# Patient Record
Sex: FEMALE | Race: WHITE | NOT HISPANIC OR LATINO | URBAN - METROPOLITAN AREA
[De-identification: names, ages, dates, MRNs, and addresses within clinical notes are randomized per-mention and may not be internally consistent; named-entity substitution may affect disease eponyms.]

---

## 2018-07-10 ENCOUNTER — PREPPED CHART (OUTPATIENT)
Dept: URBAN - METROPOLITAN AREA CLINIC 16 | Facility: CLINIC | Age: 38
End: 2018-07-10

## 2019-09-24 NOTE — PATIENT DISCUSSION
CATARACT, OU - VISUALLY SIGNIFICANT OS &gt; OD. SCHEDULE SX OS THEN LATER IN OD IF VISUAL SYMPTOMS PERSIST.

## 2019-10-02 NOTE — PATIENT DISCUSSION
Continue: prednisol ace-gatiflox-bromfen (prednisol ace-gatiflox-bromfen): drops,suspension: 1-0.5-0.075% 1 drop three times a day into affected eye 09-

## 2019-10-02 NOTE — PATIENT DISCUSSION
Myopia Counseling: The diagnosis of myopia (nearsightedness) was discussed with the patient. I explained to the patient that people who are nearsighted may be at an increased risk of a retinal detachment. Possible symptoms of retinal detachment including new onset of significant floaters or flashes or a sudden decrease in vision were reviewed with the patient. The patient understands that any of these symptoms require an immediate call to the office for an examination that day. Options for the correction of the patient's myopia were discussed may include glasses, contacts or elective refractive surgery. Return for follow-up as scheduled. Pt noted to have low urine output and hesitancy while urinating. MD notified, bladder scan ordered.

## 2019-11-05 NOTE — PATIENT DISCUSSION
Continue: prednisol ace-gatiflox-bromfen (prednisol ace-gatiflox-bromfen): drops,suspension: 1-0.5-0.075% 1 drop three times a day into affected eye

## 2019-11-14 NOTE — PATIENT DISCUSSION
S/P PC IOL, OU -  DOING WELL AND STABLE. CONTINUE DROPS AS DIRECTED. FOLLOW UP WITH REFERRING PHYSICIAN.

## 2022-03-29 ASSESSMENT — TONOMETRY
OS_IOP_MMHG: 14
OD_IOP_MMHG: 16

## 2022-03-30 ENCOUNTER — COMPREHENSIVE EXAM (OUTPATIENT)
Dept: URBAN - METROPOLITAN AREA CLINIC 16 | Facility: CLINIC | Age: 42
End: 2022-03-30

## 2022-03-30 DIAGNOSIS — H43.391: ICD-10-CM

## 2022-03-30 DIAGNOSIS — H52.223: ICD-10-CM

## 2022-03-30 DIAGNOSIS — H52.13: ICD-10-CM

## 2022-03-30 DIAGNOSIS — Z01.00: ICD-10-CM

## 2022-03-30 PROCEDURE — 92015 DETERMINE REFRACTIVE STATE: CPT

## 2022-03-30 PROCEDURE — 92310C CONTACT LENS 75

## 2022-03-30 PROCEDURE — 92004 COMPRE OPH EXAM NEW PT 1/>: CPT

## 2022-03-30 ASSESSMENT — VISUAL ACUITY
OU_CC: 20/15
OS_CC: 20/20
OD_CC: 20/20

## 2022-03-30 ASSESSMENT — TONOMETRY
OD_IOP_MMHG: 24
OS_IOP_MMHG: 21